# Patient Record
Sex: FEMALE | Race: WHITE | NOT HISPANIC OR LATINO | URBAN - METROPOLITAN AREA
[De-identification: names, ages, dates, MRNs, and addresses within clinical notes are randomized per-mention and may not be internally consistent; named-entity substitution may affect disease eponyms.]

---
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
## 2025-01-14 ENCOUNTER — HOSPITAL ENCOUNTER (OUTPATIENT)
Facility: MEDICAL CENTER | Age: 20
End: 2025-01-14
Attending: OTOLARYNGOLOGY | Admitting: OTOLARYNGOLOGY
Payer: COMMERCIAL

## 2025-01-16 DIAGNOSIS — N30.10 CHRONIC INTERSTITIAL CYSTITIS: ICD-10-CM

## 2025-01-16 DIAGNOSIS — N30.10 BLADDER PAIN SYNDROME: ICD-10-CM

## 2025-02-11 ENCOUNTER — OFFICE VISIT (OUTPATIENT)
Dept: URGENT CARE | Facility: CLINIC | Age: 20
End: 2025-02-11
Payer: COMMERCIAL

## 2025-02-11 VITALS
WEIGHT: 185.2 LBS | DIASTOLIC BLOOD PRESSURE: 72 MMHG | OXYGEN SATURATION: 100 % | RESPIRATION RATE: 18 BRPM | HEIGHT: 71 IN | BODY MASS INDEX: 25.93 KG/M2 | TEMPERATURE: 98.3 F | HEART RATE: 81 BPM | SYSTOLIC BLOOD PRESSURE: 120 MMHG

## 2025-02-11 DIAGNOSIS — K52.9 AGE (ACUTE GASTROENTERITIS): ICD-10-CM

## 2025-02-11 DIAGNOSIS — R11.0 NAUSEA: ICD-10-CM

## 2025-02-11 PROCEDURE — 99213 OFFICE O/P EST LOW 20 MIN: CPT | Performed by: NURSE PRACTITIONER

## 2025-02-11 PROCEDURE — 3078F DIAST BP <80 MM HG: CPT | Performed by: NURSE PRACTITIONER

## 2025-02-11 PROCEDURE — 3074F SYST BP LT 130 MM HG: CPT | Performed by: NURSE PRACTITIONER

## 2025-02-11 RX ORDER — ONDANSETRON 4 MG/1
4 TABLET, ORALLY DISINTEGRATING ORAL EVERY 8 HOURS PRN
Qty: 10 TABLET | Refills: 0 | Status: SHIPPED | OUTPATIENT
Start: 2025-02-11

## 2025-02-11 ASSESSMENT — ENCOUNTER SYMPTOMS
COUGH: 0
DIARRHEA: 1
SORE THROAT: 0
FATIGUE: 0
SWOLLEN GLANDS: 0
FEVER: 0
ROS GI COMMENTS: 1
VOMITING: 0
NAUSEA: 1
ABDOMINAL PAIN: 0
CHILLS: 0
CHANGE IN BOWEL HABIT: 1

## 2025-02-11 ASSESSMENT — FIBROSIS 4 INDEX: FIB4 SCORE: 0.41

## 2025-02-11 NOTE — LETTER
February 11, 2025         Patient: Polina Tovar   YOB: 2005   Date of Visit: 2/11/2025           To Whom it May Concern:    Polina Tovar was seen in my clinic on 2/11/2025. She may return to school on 2/12/25.    If you have any questions or concerns, please don't hesitate to call.        Sincerely,           MANDA Arango.  Electronically Signed

## 2025-02-12 NOTE — PROGRESS NOTES
"Subjective:   Polina Tovar is a 20 y.o. female who presents for Letter for School/Work (Patient is wanting a school note excusing her for yesterday due to vomiting, nausea, and diarrhea. )      GI Problem  This is a new problem. The current episode started yesterday. The problem occurs constantly. The problem has been gradually improving. Associated symptoms include a change in bowel habit and nausea. Pertinent negatives include no abdominal pain, chills, congestion, coughing, fatigue, fever, rash, sore throat, swollen glands or vomiting. She has tried drinking for the symptoms.       Review of Systems   Constitutional:  Negative for chills, fatigue and fever.   HENT:  Negative for congestion and sore throat.    Respiratory:  Negative for cough.    Gastrointestinal:  Positive for change in bowel habit, diarrhea and nausea. Negative for abdominal pain and vomiting.        1   Genitourinary:  Negative for dysuria.   Skin:  Negative for rash.       Medications:    albuterol Aers  ARIPiprazole  cloNIDine Tabs  escitalopram  hydrOXYzine HCl Tabs  lisdexamfetamine  ondansetron Tbdp    Allergies: Nsaids    Problem List: Polina Tovar does not have any pertinent problems on file.    Surgical History:  Past Surgical History:   Procedure Laterality Date    BREAST RECONSTRUCTION Bilateral     OTHER ABDOMINAL SURGERY      gastric sleeve       Past Social Hx: Polina Tovar  reports that she has never smoked. She has never used smokeless tobacco. She reports current alcohol use. She reports current drug use. Drug: Marijuana.     Past Family Hx:  Polina Tovar family history includes Hyperlipidemia in her mother; No Known Problems in her brother, father, and sister.     Problem list, medications, and allergies reviewed by myself today in Epic.     Objective:     /72   Pulse 81   Temp 36.8 °C (98.3 °F) (Temporal)   Resp 18   Ht 1.803 m (5' 11\")   Wt 84 kg (185 lb 3.2 oz)   SpO2 100%   BMI 25.83 kg/m² "     Physical Exam  Constitutional:       Appearance: Normal appearance. She is not ill-appearing or toxic-appearing.   HENT:      Head: Normocephalic.      Right Ear: External ear normal.      Left Ear: External ear normal.      Nose: Nose normal.      Mouth/Throat:      Lips: Pink.   Eyes:      General: Lids are normal.   Pulmonary:      Effort: Pulmonary effort is normal. No accessory muscle usage.   Abdominal:      Tenderness: There is no abdominal tenderness.   Musculoskeletal:      Cervical back: Full passive range of motion without pain.   Neurological:      Mental Status: She is alert and oriented to person, place, and time.   Psychiatric:         Mood and Affect: Mood normal.         Thought Content: Thought content normal.         Assessment/Plan:     Diagnosis and associated orders:     1. Nausea  ondansetron (ZOFRAN ODT) 4 MG TABLET DISPERSIBLE      2. AGE (acute gastroenteritis)           Comments/MDM:     It was explained today that due to the viral nature of the pt's illness, we will treat symptomatically today.   Encouraged OTC supportive meds PRN. Humidification, increase fluids,   Discussed side effects of OTC meds and any prescribed.  Given precautionary s/sx that mandate immediate follow up and evaluation in the ED. Advised of risks of not doing so.    DDX, Supportive care, and indications for immediate follow-up discussed with patient.    Instructed to return to clinic or nearest emergency department if we are not available for any change in condition, further concerns, or worsening of symptoms.    The patient  and/or guardian demonstrated a good understanding and agreed with the treatment plan.                   Please note that this dictation was created using voice recognition software. I have made a reasonable attempt to correct obvious errors, but I expect that there are errors of grammar and possibly content that I did not discover before finalizing the note.    This note was electronically  signed by Ish MARION.

## 2025-02-21 ENCOUNTER — APPOINTMENT (OUTPATIENT)
Dept: URGENT CARE | Facility: CLINIC | Age: 20
End: 2025-02-21
Payer: COMMERCIAL

## 2025-05-02 ENCOUNTER — OFFICE VISIT (OUTPATIENT)
Dept: MEDICAL GROUP | Age: 20
End: 2025-05-02
Payer: COMMERCIAL

## 2025-05-02 VITALS
WEIGHT: 185 LBS | DIASTOLIC BLOOD PRESSURE: 86 MMHG | SYSTOLIC BLOOD PRESSURE: 110 MMHG | HEIGHT: 71 IN | TEMPERATURE: 97 F | BODY MASS INDEX: 25.9 KG/M2 | OXYGEN SATURATION: 98 % | HEART RATE: 117 BPM

## 2025-05-02 DIAGNOSIS — R00.2 PALPITATIONS: ICD-10-CM

## 2025-05-02 DIAGNOSIS — R55 SYNCOPE, UNSPECIFIED SYNCOPE TYPE: ICD-10-CM

## 2025-05-02 DIAGNOSIS — R23.8 SKIN SENSITIVITY: ICD-10-CM

## 2025-05-02 DIAGNOSIS — N30.10 CHRONIC INTERSTITIAL CYSTITIS: ICD-10-CM

## 2025-05-02 DIAGNOSIS — M25.50 ARTHRALGIA, UNSPECIFIED JOINT: ICD-10-CM

## 2025-05-02 DIAGNOSIS — R53.83 OTHER FATIGUE: ICD-10-CM

## 2025-05-02 DIAGNOSIS — K64.9 HEMORRHOIDS, UNSPECIFIED HEMORRHOID TYPE: ICD-10-CM

## 2025-05-02 PROCEDURE — 3074F SYST BP LT 130 MM HG: CPT | Performed by: PHYSICIAN ASSISTANT

## 2025-05-02 PROCEDURE — 3078F DIAST BP <80 MM HG: CPT | Performed by: PHYSICIAN ASSISTANT

## 2025-05-02 PROCEDURE — 99215 OFFICE O/P EST HI 40 MIN: CPT | Performed by: PHYSICIAN ASSISTANT

## 2025-05-02 RX ORDER — AZITHROMYCIN 500 MG/1
TABLET, FILM COATED ORAL
COMMUNITY
Start: 2025-03-01 | End: 2025-05-02

## 2025-05-02 RX ORDER — OXYCODONE AND ACETAMINOPHEN 5; 325 MG/1; MG/1
TABLET ORAL
COMMUNITY
Start: 2025-02-24 | End: 2025-05-02

## 2025-05-02 RX ORDER — SULFAMETHOXAZOLE AND TRIMETHOPRIM 800; 160 MG/1; MG/1
TABLET ORAL
COMMUNITY
End: 2025-05-02

## 2025-05-02 RX ORDER — NITROFURANTOIN 25; 75 MG/1; MG/1
CAPSULE ORAL
COMMUNITY
Start: 2024-09-23 | End: 2025-05-02

## 2025-05-02 RX ORDER — CIPROFLOXACIN 500 MG/1
TABLET, FILM COATED ORAL
COMMUNITY
Start: 2025-01-16 | End: 2025-05-02

## 2025-05-02 RX ORDER — NYSTATIN 100000 [USP'U]/ML
SUSPENSION ORAL
COMMUNITY
Start: 2025-02-28 | End: 2025-05-02

## 2025-05-02 RX ORDER — OXYCODONE HYDROCHLORIDE 5 MG/1
TABLET ORAL
COMMUNITY
Start: 2024-09-27 | End: 2025-05-02

## 2025-05-02 RX ORDER — AMOXICILLIN AND CLAVULANATE POTASSIUM 500; 125 MG/1; MG/1
TABLET, FILM COATED ORAL
COMMUNITY
Start: 2024-09-27 | End: 2025-05-02

## 2025-05-02 ASSESSMENT — PATIENT HEALTH QUESTIONNAIRE - PHQ9: CLINICAL INTERPRETATION OF PHQ2 SCORE: 0

## 2025-05-02 ASSESSMENT — FIBROSIS 4 INDEX: FIB4 SCORE: 0.39

## 2025-05-02 NOTE — PROGRESS NOTES
cc: Hospital follow-up, palpitations, syncopal episodes, rheumatological workup    Subjective:     HPI    History of Present Illness  The patient was admitted from 04/27/2025 through 04/29/2025 with a diagnosis of an infection due to ESBL-producing E. coli. She was referred by urology for urinary incontinence. An initial urinalysis was performed, and she was discharged once her nausea subsided. However, she was called back for admission and IV antibiotics after culture results. She received fosfomycin but experienced an allergic reaction and was sent home with epinephrine. Upon returning to the hospital, she reported symptoms that were 10 times worse, including difficulty urinating, a burning sensation, and urinary incontinence. She was subsequently treated with IV Zosyn. Her case was reviewed with Dr. Lucio from Infectious Disease, who determined that there are no current indications to continue antibiotics as she had already received a dose of medication that should have adequately treated her ESBL E. coli cystitis.  In regards to her urinary symptoms they have improved, did have an appointment with her urologist yesterday, but had to cancel as she had to go to work.  Plans to follow-up with her urologist soon.    She reports experiencing dizziness and hives on her chest, which she attributes to a drop in blood sugar levels. These symptoms appear to be triggered if she does not consume sugary foods or drinks. She also experiences lightheadedness and near syncope. Her urologist has recommended testing for autoimmune disorders due to the concurrent onset of various symptoms. She has a family history of lupus and personally experiences migraines, bladder issues, hives, and new allergies. She also reports increased sun sensitivity, resulting in blistering burns even with sunscreen use. She has previously consulted an allergist. She experiences joint pain, particularly in her elbows and knees, and reports persistent  swelling but no heat in these joints. She also reports cognitive difficulties, including poor memory and decreased motivation, which have impacted her academic performance. She reports an improvement in her urinary symptoms. She missed a scheduled appointment with her urologist due to work commitments and needs to schedule another one.    She reports experiencing hemorrhoids and is seeking advice on management. She reports rectal bleeding but no itching or pain.    She reports experiencing dizziness and swelling when exposed to heat, such as during hot baths or showers. She experienced a syncopal episode approximately one month ago, preceded by a sensation of impending loss of consciousness, which allowed her to sit down and prevent a fall. She has been experiencing chest pain and shortness of breath for the past 6 to 10 weeks. She experiences daily palpitations, which last until she can relax and consume something sugary.            Review of systems:  See above.       Current Outpatient Medications:     ondansetron (ZOFRAN ODT) 4 MG TABLET DISPERSIBLE, Take 1 Tablet by mouth every 8 hours as needed for Nausea/Vomiting., Disp: 10 Tablet, Rfl: 0    escitalopram (LEXAPRO) 20 MG tablet, Take 20 mg by mouth every day., Disp: , Rfl:     hydrOXYzine HCl (ATARAX) 50 MG Tab, TAKE 1/2 TO 1 TABLET BY MOUTH DAILY AS NEEDED FOR ANXIETY, Disp: , Rfl:     lisdexamfetamine (VYVANSE) 50 MG capsule, 60 mg., Disp: , Rfl:     albuterol 108 (90 Base) MCG/ACT Aero Soln inhalation aerosol, , Disp: , Rfl:     ondansetron (ZOFRAN ODT) 4 MG TABLET DISPERSIBLE, Take 1 Tablet by mouth every 6 hours as needed for Nausea/Vomiting., Disp: 10 Tablet, Rfl: 0    ARIPiprazole (ABILIFY) 5 MG tablet, Take 5 mg by mouth every day., Disp: , Rfl:     cloNIDine (CATAPRES) 0.2 MG Tab, Take 1 Tablet by mouth at bedtime., Disp: , Rfl:     oxyCODONE immediate-release (ROXICODONE) 5 MG Tab, Take 1 tablet(s) EVERY 4 HOURS by oral route prn severe pain  "refarctory to ibuprofen or tylenol (for 5 days) (Patient not taking: Reported on 5/2/2025), Disp: , Rfl:     oxyCODONE-acetaminophen (PERCOCET) 5-325 MG Tab, TAKE 1 TABLET BY MOUTH EVERY 6 HOURS FOR 3 DAYS AS NEEDED (Patient not taking: Reported on 5/2/2025), Disp: , Rfl:     sulfamethoxazole-trimethoprim (BACTRIM DS) 800-160 MG tablet, TAKE 1 TABLET BY MOUTH EVERY 12 HOURS FOR 5 DAYS (Patient not taking: Reported on 5/2/2025), Disp: , Rfl:     Allergies, past medical history, past surgical history, family history, social history reviewed and updated    Objective:     Vitals: /86 (BP Location: Right arm, Patient Position: Standing, BP Cuff Size: Adult)   Pulse (!) 117   Temp 36.1 °C (97 °F) (Temporal)   Ht 1.803 m (5' 11\")   Wt 83.9 kg (185 lb)   SpO2 98%   BMI 25.80 kg/m²   General: Alert, pleasant, NAD  HEENT: Normocephalic. Neck supple.  No thyromegaly or masses palpated. No cervical or supraclavicular lymphadenopathy. No carotid bruits   Heart: Regular rate and rhythm.  S1 and S2 normal.  No murmurs appreciated.  Respiratory: Normal respiratory effort.  Clear to auscultation bilaterally.  Skin: Warm, dry, no rashes.  Extremities: No leg edema.  No joint redness or warmth.  Radial pulses 2+ symmetric  Psych:  Affect/mood is normal, judgement is good, memory is intact, grooming is appropriate.      EKG Interpretation-HR is 63 normal EKG, normal sinus rhythm.  No previous EKG to review    Orthostatics: lying pulse 64, /72, sitting pulse 74, /80, standing pulse 117, /86      Assessment/Plan:     Polina was seen today for ed follow-up.    Diagnoses and all orders for this visit:    Chronic interstitial cystitis  Did require IV antibiotics during hospital, infectious disease was consulted, did not believe that she needed to be treated again as she did receive the josamycin.  Symptomatically doing better.  Following up with urology.    Arthralgia, unspecified joint  -In discussion with " patient she is not meeting any true specific markers for rheumatoid for this or lupus, however does have nonspecific joint pain, intermittent rashes, but not a Maller rash, fatigue, we will screen for lupus as this has been mentioned to her by her ENT specialist and urologist.  -     PATRICIA REFLEXIVE PROFILE; Future  -     CCP; Future  -     DSDNA AB, IGG W/RFLX TO IFA TITER; Future  -     RHEUMATOID ARTHRITIS FACTOR; Future  -     Sed Rate; Future  -     TSH WITH REFLEX TO FT4; Future  -     VITAMIN D,25 HYDROXY (DEFICIENCY); Future  -     CBC WITH DIFFERENTIAL; Future    Other fatigue  See above    Syncope, unspecified syncope type  -Did have a syncopal event approximately 4 weeks ago, has been having intermittent palpitations with associated chest pain.  On orthostatics blood pressure remained stable however heart rate did increase almost 40 beats when going from sitting to standing.  Potentially could be a component of POTS.  Will obtain Zio patch, also placed referral to cardiology to consider tilt table test  -     TSH WITH REFLEX TO FT4; Future  -     CBC WITH DIFFERENTIAL; Future  -     [facility] ZIO PATCH MONITOR; Future  -     EKG - Clinic Performed  -     REFERRAL TO CARDIOLOGY  -     Comp Metabolic Panel; Future    Palpitations  -See above  -     [facility] ZIO PATCH MONITOR; Future  -     EKG - Clinic Performed  -     REFERRAL TO CARDIOLOGY    Hemorrhoids, unspecified hemorrhoid type  Recommended sitz bath's, fiber daily, over-the-counter Preparation H.  If symptoms persist do not improve will consider referral to general surgery for banding or excision    Skin sensitivity  -Has been noting more skin sensitivities, interestingly a lot more sensitivity to the sun also intermittent hives, although could be anxiety/stress related.  Referral placed to allergist to consider allergy skin testing  -     Referral to Allergy    My total time spent caring for the patient on the day of the encounter was 40 minutes.   This does  not include time spent on separately billable procedures/tests.      Return in about 6 weeks (around 6/13/2025) for Lab Review/ ziopatch result.

## 2025-05-08 ENCOUNTER — NON-PROVIDER VISIT (OUTPATIENT)
Dept: CARDIOLOGY | Facility: MEDICAL CENTER | Age: 20
End: 2025-05-08
Attending: PHYSICIAN ASSISTANT
Payer: COMMERCIAL

## 2025-05-08 DIAGNOSIS — R00.2 PALPITATIONS: ICD-10-CM

## 2025-05-08 DIAGNOSIS — R55 SYNCOPE, UNSPECIFIED SYNCOPE TYPE: ICD-10-CM

## 2025-05-08 PROCEDURE — 93246 EXT ECG>7D<15D RECORDING: CPT

## 2025-05-08 NOTE — Clinical Note
REFERRAL APPROVAL NOTICE         Sent on May 8, 2025                   Polina Tovar  [address] [place] [address]                   Dear Ms. Tovar,    After a careful review of the medical information and benefit coverage, [facility] has processed your referral. See below for additional details.    If applicable, you must be actively enrolled with your insurance for coverage of the authorized service. If you have any questions regarding your coverage, please contact your insurance directly.    REFERRAL INFORMATION   Referral #:  [id number]  Referred-To Provider    Referred-By Provider:  Allergy and Immunology    Ashley Gomez P.A.-C.   [facility]      [address] [place] [address]  [phone] [address] [address]  [phone]    Referral Start Date:  05/02/2025  Referral End Date:   05/02/2026             SCHEDULING  If you do not already have an appointment, please call [phone] to make an appointment.     MORE INFORMATION  If you do not already have a [employer] account, sign up at: [email or web address]  You can access your medical information, make appointments, see lab results, billing information, and more.  If you have questions regarding this referral, please contact  the [facility] Referrals department at:             [phone]. Monday - Friday 8:00AM - 5:00PM.     Sincerely,    [facility]

## 2025-05-08 NOTE — PROGRESS NOTES
Patient enrolled in the 14 day o Holter monitoring program per Ashley Gomze PA-C.  >Office hook-up, serial # [id number].  >Currently pending EOS.

## 2025-05-14 ENCOUNTER — APPOINTMENT (OUTPATIENT)
Dept: CARDIOLOGY | Facility: MEDICAL CENTER | Age: 20
End: 2025-05-14
Attending: PHYSICIAN ASSISTANT
Payer: COMMERCIAL

## 2025-06-14 NOTE — PROGRESS NOTES
Medical decision making:  - She appears to be on the spectrum of autonomic dysfunction, it is unclear if she has more of orthostatic hypotension component or POTS component. Heart monitor turned in, results are not back yet.  This will tell us more about her physiology.  -Echo for reassurance that she has a structurally normal heart, no rush.  -Information give about lifestyle changes, increased salt with fluids, compression socks. If she continues to have symptoms, consider pharmacotherapy intervention listed below.  - Has asthma so I am not certain how she would do with beta-blockers.  - She is working on anxiety control.  -It is unclear if her nerve stimulator for cystitis is playing a role but her quality of life is much improved with this so we would never ask her to remove it or turn it off.  -Vyvanse can cause tachycardia, sometimes side effects are offset it with beta-blockers or calcium channel blockers.  - I do not think she needs tilt table testing, at least not for me.  - She is very physically active all day long at her job, no formal exercise regimen.  - RTC 4 to 6 weeks to follow-up on heart monitor and interventions listed below.    Problem list:  1. Palpitations  - EKG  - EC-ECHOCARDIOGRAM COMPLETE W/O CONT; Future    2. S/P gastric sleeve procedure    3. Near syncope    4. Autonomic dysfunction    5. Mild intermittent asthma without complication    6. Other specified hypotension     Chief Complaint:   Chief Complaint   Patient presents with    New Patient     Per referral:  -Syncope, unspecified syncope type   - Palpitations           History of Present Illness:  Polina Tovar is a 20 y.o. female who is referred by Ashley Gomez for palpitations, syncope, prior gastric sleeve, autonomic dysfunction, cystitis with pain neurostimulator.    Has been dealing with cystitis, has a pain stimulator.  Was in the hospital with bacteria.  Noticed abnormal heart rate and blood pressure.    Has had  lightheadedness going back about 1 year.  Progressing in frequency and intensity over the past year.  Stimulator for cystitis 1 and at that time but there did not appear to be a direct correlation with her symptoms.  Episodes began with lightheadedness, specifically she is falling over, sees colors flashing, loses balance.  Feels much better when she can sit down and even much better when she lies down.  Has to stay down for at least 2 minutes.  Has had episodes of syncope also.  They came home in the evening from playing pickle ball, getting ready to have a shower. Was under stress, having anxiety, Stanley heard crying, then found her sitting on the floor and slumped over, shaking, strange breathing per Stanley who was able to get her up and onto the bed. She recalls being confused. Appeared pale.   Very hot showers can make symptoms worse and legs more swollen.  Can get heart racing and palpitations at times.  Intermittent atypical chest discomfort.    Just wear heart monitor which has been turned in.    She has tried strategies of eating more frequently, trying to stay well-hydrated, still having some symptoms.    Has challenging to crawl anxiety and insomnia which may be contributing.    No known COVID.  Not sure if any of the symptoms since 2020 are related to vaccination.    Prior gastric sleeve    No prior hypertension. BP runs low but can spike at times.  Sounds like it was elevated during orthostatics and also in the hospital.    HLP, no meds.    Asthma since childhood.  Has not been on a maintenance inhaler.     No family history of premature coronary artery disease.  No prior smoking history.  No history of diabetes.  No history of autoimmune disease such as lupus or rheumatoid arthritis.  No history of chest radiation or cardiotoxic chemotherapy.  No chronic kidney disease.  No ETOH overuse.  No caffeine overuse.  No recreation substance use.    Not limited by chest pain, pressure or tightness with  "activity.  No significant dyspnea on exertion, orthopnea or lower extremity swelling.  No significant palpitations, lightheadedness, or presyncope/syncope.  No symptoms of leg claudication.  No stroke/TIA like symptoms.    Lives in [place], [place].  Point of contact is her mother Brielle Tovar.  Here with SOStanley.   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    Wt Readings from Last 5 Encounters:   06/20/25 83.9 kg (185 lb)   05/02/25 83.9 kg (185 lb)   02/11/25 84 kg (185 lb 3.2 oz)   11/05/24 92.9 kg (204 lb 12.8 oz) (98%, Z= 2.01)*   10/28/24 90.4 kg (199 lb 4.8 oz) (97%, Z= 1.93)*     * Growth percentiles are based on [facility] (Girls, 2-20 Years) data.       DATA REVIEWED by me:  ECG (my personal interpretation)  6-20-25 sinus, 82  5-2-25 sinus, 63    Heart Monitor    NA    Device check    NA    EP procedure  NA    Echocardiogram    pending    Heart Catheterization    NA    Stress test    N/A    Most recent labs:        No results found for: \"LIPOPROTA\"  Lab Results   Component Value Date/Time    WBC 8.4 06/19/2025 12:34 AM    HEMOGLOBIN 14.4 06/19/2025 12:34 AM    HEMATOCRIT 43.7 06/19/2025 12:34 AM    MCV 80.9 (L) 06/19/2025 12:34 AM      Lab Results   Component Value Date/Time    SODIUM 137 06/19/2025 12:34 AM    POTASSIUM 3.7 06/19/2025 12:34 AM    CHLORIDE 104 06/19/2025 12:34 AM    CO2 20 06/19/2025 12:34 AM    GLUCOSE 92 06/19/2025 12:34 AM    BUN 12 06/19/2025 12:34 AM    CREATININE 0.67 06/19/2025 12:34 AM      Lab Results   Component Value Date/Time    ASTSGOT 25 06/19/2025 12:34 AM    ALTSGPT 19 06/19/2025 12:34 AM    ALBUMIN 4.7 06/19/2025 12:34 AM      No results found for: \"CHOLSTRLTOT\", \"LDL\", \"HDL\", \"TRIGLYCERIDE\"  Recent Labs     06/19/25  0034   TROPONINT <6         Past Medical History[1]  Past Surgical History[2]  Family History   Problem Relation Age of Onset    Hyperlipidemia Mother     No Known Problems Father     No Known Problems Sister     No Known Problems Brother  "     Social History     Socioeconomic History    Marital status: Single     Spouse name: Not on file    Number of children: Not on file    Years of education: Not on file    Highest education level: 12th grade   Occupational History    Not on file   Tobacco Use    Smoking status: Never    Smokeless tobacco: Never   Vaping Use    Vaping status: Some Days    Substances: Nicotine   Substance and Sexual Activity    Alcohol use: Yes     Comment: rare    Drug use: Yes     Types: Marijuana     Comment: rare    Sexual activity: Yes     Partners: Male     Birth control/protection: I.U.D.   Other Topics Concern    Not on file   Social History Narrative    Not on file     Social Drivers of Health     Financial Resource Strain: Low Risk  (3/13/2024)    Overall Financial Resource Strain (CARDIA)     Difficulty of Paying Living Expenses: Not very hard   Food Insecurity: No Food Insecurity (3/13/2024)    Hunger Vital Sign     Worried About Running Out of Food in the Last Year: Never true     Ran Out of Food in the Last Year: Never true   Transportation Needs: No Transportation Needs (3/13/2024)    PRAPARE - Transportation     Lack of Transportation (Medical): No     Lack of Transportation (Non-Medical): No   Physical Activity: Sufficiently Active (3/13/2024)    Exercise Vital Sign     Days of Exercise per Week: 7 days     Minutes of Exercise per Session: 40 min   Stress: Stress Concern Present (3/13/2024)    [employer] - Occupational Stress Questionnaire     Feeling of Stress : To some extent   Social Connections: Moderately Isolated (3/13/2024)    Social Connection and Isolation Panel [NHANES]     Frequency of Communication with Friends and Family: More than three times a week     Frequency of Social Gatherings with Friends and Family: More than three times a week     Attends Tenri[employer] Services: 1 to 4 times per year     Active Member of Clubs or Organizations: No     Attends Club or Organization  "Meetings: Never     Marital Status: Never    Intimate Partner Violence: Not on file   Housing Stability: Low Risk  (3/13/2024)    Housing Stability Vital Sign     Unable to Pay for Housing in the Last Year: No     Number of Places Lived in the Last Year: 2     Unstable Housing in the Last Year: No     Allergies[3]    Current Medications[4]    ROS  All others systems reviewed and negative.    BP 90/58 (BP Location: Left arm, Patient Position: Sitting, BP Cuff Size: Adult)   Pulse 74   Resp 14   Ht 1.803 m (5' 11\")   Wt 83.9 kg (185 lb)   SpO2 97%  Body mass index is 25.8 kg/m².    General: No acute distress. Well nourished.  HEENT: EOM grossly intact, no scleral icterus, no pharyngeal erythema.   Neck:  No JVD, no bruits, trachea midline  CVS: RRR. Normal S1, S2. No M/R/G. No LE edema.  2+ radial pulses, 2+ PT pulses  Resp: CTAB. No wheezing or crackles/rhonchi. Normal respiratory effort.  Abdomen: Soft, NT, no sanya hepatomegaly.  MSK/Ext: No clubbing or cyanosis.  Skin: Warm and dry, no rashes.  Neurological: CN III-XII grossly intact. No focal deficits.   Psych: A&O x 3, appropriate affect, good judgement, tearful at times      Return in about 4 weeks (around 7/18/2025).    Written instructions given today:      Autonomic dysfunction, orthostatic tension, POTS and Inappropriate Sinus Tachycardia are all on the spectrum but I simply like to classify as autonomic dysfunction.    This autonomic dysfunction is not a serious heart condition.  It is abnormality in the autonomic nervous system.  Heart rate and blood pressure regulation have very little to do with heart function.    Heart rate and blood pressure are regulated predominantly by the autonomic nervous system, salt and water balance and hormone balance.  The good news is that you do not have a serious heart condition.    But this can also be frustrating as targeted therapies are implemented through predominantly a trial and error approach.    I am " not an expert in these conditions as they are fundamentally not a cardiology condition, however I am willing to try to adjust the situation to help relieve symptoms.  We do not have an autonomic dysfunction center in this area.  There are autonomic dysfunction centers at [place], the [facility], [facility] in [facility], [facility] in [facility].  We can refer you if you prefer.    There are very few electrophysiologist cardiologist who deal with physiology.  Electrophysiologist cardiologists in modern times predominantly deal specifically with electrical disease located in the heart.  Your symptoms do not reflect electrical disease so in our region, electrophysiologist have no role.  Your symptoms reflect the autonomic nervous system telling the heart to inappropriately speed up which can cause people to feel very poorly but does not result from cardiac disease.  Some people have inappropriate sinus tachycardia (IST) which means the heart rate speeds up quickly because the heart is being told to beat faster due to inappropriate regulation from the nervous system.    Blood pressure regulation example:  When we stand up, more than 700 mL of blood begins to drop into the legs.  The receptors in the neck and the chest that sense the sudden drop are supposed to tell the blood vessels in the body to clamp down hard resulting in blood pressure going up slightly.  This is something that does not work correctly for some people with PASC or POTS.    Sometimes the faster heart rate is the predominant feature and we use interventions to manipulate the heart rate response.  For some patients it is predominantly the drop in blood pressure in which case we focus more on keeping the blood volume in the upper body as high as possible.  For many people it is both.    While I am not a specialist, I have found there are few providers in the region willing to take on this diagnosis.  I have been collecting  data from other providers and patients to find some things that might help.    =====  Interventions:  1. Salt and water intake is vital.  Some patients have to increase their salt/sodium between 2000 to 4000 mg daily with water.  Drinking regular water will result in water passing straight through to the toilet.  You need the salt to keep the water inside the blood vessels to raise blood pressure.  2.  Compression socks: Do not underestimate the value, even if they only help 5 to 10%.  Compression socks help keep blood from dropping into the legs and staying there.  There are knee-high compression socks, thigh-high compression socks (which often fall down) and pantyhose style compression socks.  Compression socks are typically based on shoe size and the amount of pressure.  You do not need hospital grade socks, but I do recommend you experiment with the amount of pressure until you find what works for you.  The socks are based on shoe size and amount of compression (mmHg).  I recommend a larger size to avoid toe compression.  Compression strengths:  8-15 mmHg (typically very light compression)  15-20 mmHg  20-30 mmHg  30-40 mmhg (can be very tight and challenging to get on)  *Since they are a standard size larger legs need less compression, smaller legs may need more compression.  3.  Exercise: You will need to research a way to exercise and fight back.    -Some people can only exercise for short times and must start and stop.    -Some people need extra recovery time the next day and cannot exercise every day.    -Some people need to exercise lying on the floor, such as calisthenics.  -Some people can exercise in a recumbent bicycle.  Some people purchase a leg bicycle to use in their own home.  -You will need to set a goal for yourself, even if it is only for 10 minutes a day.  Your goal is to work up 20 to 30 minutes every day even if it takes months.  Cardiovascular exercises the way to retrain your physiology,  to blunt the heart rate response and improve blood pressure.  It is okay to exercise with your compression socks.  Here is some information about exercise you might find helpful:  https://[email or web address]l.org/pdf/[facility]_Modified_[email or web address]_POTS_Exercise_Program.pdf  4.  Medical therapy interventions to help manipulate physiology can sometimes be helpful.  Please understand these medications are not intended to treat a serious medical situation so you do not have to take them.  These medications are simply there to help you feel better.  If you take any medication that makes you feel worse, stop the medication.    ======  Medical therapy interventions:  1.  Beta-blockers or calcium channel blockers-we often utilize these to blunt the fast heart rate response.  There are many different kinds that can be tried.  I typically start with metoprolol tartrate 25 mg tablets, you can take one half or 1 whole tablet twice daily.  You can take up to 4 tablets twice daily.  The maximum daily dose is metoprolol to tartrate 100 mg a.m. and p.m.  - If metoprolol does not work, sometimes a try propranolol, diltiazem/Cardizem or verapamil.  2.  Midodrine-this medication is like a reverse blood pressure medication.  This medication can be taken every 4-6 hours.  I recommend trying 5 or 10 mg taken 4 or 5 times a day to help relieve symptoms.  If the medication does not help, you do not have to take it.  Some people only take it as needed before exercise or certain activities and do not take it all day long.  3.  Droxidopa-this is an old Parkinson's style medication that can help some people with orthostatic hypotension and therefore help some people with autonomic dysfunction or POTS.  Sometimes this medication is expensive with your insurance, it can be purchased for cash online through the [employer] [facility] pharmacy for $37-80 per month depending on the dose and the frequency.  You can also look for a [employer] coupon.   Some patients get more expensive medications from [facility] and I can provide you with a few phone numbers where patients have had good results.  4. Florinef-is a corticosteroid type medication that stimulates steroids made by your adrenal glands.  Sometimes this helps with fluid and salt retention.  Sometimes long-term use can result in your adrenal glands not making as much of their own so I try to use this as more of a last resort.  5.  IV fluids-some people have such dramatic symptoms that from time to time they go to the infusion center for a liter of normal saline or lactated Ringer's.  This is something we can discuss if you feel it might be helpful.  6.  Ivabradine= Corlanor.  This is a wonderful medication that can treat inappropriate sinus tachycardia by slowing down the sinus node and the heart rate without affecting blood pressure.  Unfortunately, it is very expensive and very difficult to get approved from insurance.  Some people purchased a medication through [facility] for lower price.      It is my pleasure to participate in the care of Ms. Tovar.  Please do not hesitate to contact me with questions or concerns.    Kaylee Walker MD, [facility]  Cardiologist, [facility]    Please note that this dictation was created using voice recognition software. I have made every reasonable attempt to correct obvious errors, but it is possible there are errors of grammar and possibly content that I did not discover before finalizing the note.         [1]   Past Medical History:  Diagnosis Date    Allergy     Anxiety     Asthma     Clostridium difficile carrier 9/19/2022    C. diff (+) 9/19/22Problem added by Discern Expert based on Positive C Diff test result    Depression     Migraine    [2]   Past Surgical History:  Procedure Laterality Date    BREAST RECONSTRUCTION Bilateral     OTHER ABDOMINAL SURGERY      gastric sleeve   [3]   Allergies  Allergen Reactions    Ciprofloxacin Hives      Cipro    Fosfomycin Hives and Swelling    Nsaids Anaphylaxis, Anxiety, Hives, Nausea, Shortness of Breath and Swelling   [4]   Current Outpatient Medications   Medication Sig Dispense Refill    escitalopram (LEXAPRO) 20 MG tablet Take 20 mg by mouth every day.      lisdexamfetamine (VYVANSE) 50 MG capsule 60 mg.      albuterol 108 (90 Base) MCG/ACT Aero Soln inhalation aerosol       ARIPiprazole (ABILIFY) 5 MG tablet Take 5 mg by mouth every day.      cloNIDine (CATAPRES) 0.2 MG Tab Take 1 Tablet by mouth at bedtime.       No current facility-administered medications for this visit.

## 2025-06-17 ENCOUNTER — TELEPHONE (OUTPATIENT)
Facility: MEDICAL CENTER | Age: 20
End: 2025-06-17
Payer: COMMERCIAL

## 2025-06-18 ENCOUNTER — HOSPITAL ENCOUNTER (EMERGENCY)
Facility: MEDICAL CENTER | Age: 20
End: 2025-06-19
Payer: COMMERCIAL

## 2025-06-18 VITALS
OXYGEN SATURATION: 97 % | HEIGHT: 71 IN | TEMPERATURE: 98.4 F | BODY MASS INDEX: 24.5 KG/M2 | RESPIRATION RATE: 20 BRPM | SYSTOLIC BLOOD PRESSURE: 130 MMHG | HEART RATE: 98 BPM | WEIGHT: 175 LBS | DIASTOLIC BLOOD PRESSURE: 90 MMHG

## 2025-06-18 LAB
EKG IMPRESSION: NORMAL
GLUCOSE BLD STRIP.AUTO-MCNC: 86 MG/DL (ref 65–99)

## 2025-06-18 PROCEDURE — 93005 ELECTROCARDIOGRAM TRACING: CPT | Mod: TC

## 2025-06-18 PROCEDURE — 302449 STATCHG TRIAGE ONLY (STATISTIC)

## 2025-06-18 PROCEDURE — 82962 GLUCOSE BLOOD TEST: CPT

## 2025-06-18 ASSESSMENT — FIBROSIS 4 INDEX: FIB4 SCORE: 0.39

## 2025-06-18 NOTE — TELEPHONE ENCOUNTER
Called patient in regards to records for NP appointment with Dr. HINTON To review most recent lab results, ekg, any cardiac testing or ,if patient has been treated by a cardiologist. No answer, left voicemail to call back

## 2025-06-19 LAB
ALBUMIN SERPL BCP-MCNC: 4.7 G/DL (ref 3.2–4.9)
ALBUMIN/GLOB SERPL: 1.6 G/DL
ALP SERPL-CCNC: 75 U/L (ref 30–99)
ALT SERPL-CCNC: 19 U/L (ref 2–50)
ANION GAP SERPL CALC-SCNC: 13 MMOL/L (ref 7–16)
AST SERPL-CCNC: 25 U/L (ref 12–45)
BASOPHILS # BLD AUTO: 0.2 % (ref 0–1.8)
BASOPHILS # BLD: 0.02 K/UL (ref 0–0.12)
BILIRUB SERPL-MCNC: 0.8 MG/DL (ref 0.1–1.5)
BUN SERPL-MCNC: 12 MG/DL (ref 8–22)
CALCIUM ALBUM COR SERPL-MCNC: 8.9 MG/DL (ref 8.5–10.5)
CALCIUM SERPL-MCNC: 9.5 MG/DL (ref 8.5–10.5)
CHLORIDE SERPL-SCNC: 104 MMOL/L (ref 96–112)
CO2 SERPL-SCNC: 20 MMOL/L (ref 20–33)
CREAT SERPL-MCNC: 0.67 MG/DL (ref 0.5–1.4)
EOSINOPHIL # BLD AUTO: 0.04 K/UL (ref 0–0.51)
EOSINOPHIL NFR BLD: 0.5 % (ref 0–6.9)
ERYTHROCYTE [DISTWIDTH] IN BLOOD BY AUTOMATED COUNT: 37.7 FL (ref 35.9–50)
GFR SERPLBLD CREATININE-BSD FMLA CKD-EPI: 128 ML/MIN/1.73 M 2
GLOBULIN SER CALC-MCNC: 3 G/DL (ref 1.9–3.5)
GLUCOSE SERPL-MCNC: 92 MG/DL (ref 65–99)
HCG SERPL QL: NEGATIVE
HCT VFR BLD AUTO: 43.7 % (ref 37–47)
HGB BLD-MCNC: 14.4 G/DL (ref 12–16)
IMM GRANULOCYTES # BLD AUTO: 0.02 K/UL (ref 0–0.11)
IMM GRANULOCYTES NFR BLD AUTO: 0.2 % (ref 0–0.9)
LIPASE SERPL-CCNC: 19 U/L (ref 11–82)
LYMPHOCYTES # BLD AUTO: 3.49 K/UL (ref 1–4.8)
LYMPHOCYTES NFR BLD: 41.4 % (ref 22–41)
MCH RBC QN AUTO: 26.7 PG (ref 27–33)
MCHC RBC AUTO-ENTMCNC: 33 G/DL (ref 32.2–35.5)
MCV RBC AUTO: 80.9 FL (ref 81.4–97.8)
MONOCYTES # BLD AUTO: 0.6 K/UL (ref 0–0.85)
MONOCYTES NFR BLD AUTO: 7.1 % (ref 0–13.4)
NEUTROPHILS # BLD AUTO: 4.26 K/UL (ref 1.82–7.42)
NEUTROPHILS NFR BLD: 50.6 % (ref 44–72)
NRBC # BLD AUTO: 0 K/UL
NRBC BLD-RTO: 0 /100 WBC (ref 0–0.2)
PLATELET # BLD AUTO: 328 K/UL (ref 164–446)
PMV BLD AUTO: 10.2 FL (ref 9–12.9)
POTASSIUM SERPL-SCNC: 3.7 MMOL/L (ref 3.6–5.5)
PROT SERPL-MCNC: 7.7 G/DL (ref 6–8.2)
RBC # BLD AUTO: 5.4 M/UL (ref 4.2–5.4)
SODIUM SERPL-SCNC: 137 MMOL/L (ref 135–145)
TROPONIN T SERPL-MCNC: <6 NG/L (ref 6–19)
WBC # BLD AUTO: 8.4 K/UL (ref 4.8–10.8)

## 2025-06-19 PROCEDURE — 84484 ASSAY OF TROPONIN QUANT: CPT

## 2025-06-19 PROCEDURE — 85025 COMPLETE CBC W/AUTO DIFF WBC: CPT

## 2025-06-19 PROCEDURE — 80053 COMPREHEN METABOLIC PANEL: CPT

## 2025-06-19 PROCEDURE — 83690 ASSAY OF LIPASE: CPT

## 2025-06-19 PROCEDURE — 84703 CHORIONIC GONADOTROPIN ASSAY: CPT

## 2025-06-19 NOTE — ED TRIAGE NOTES
Chief Complaint   Patient presents with    Syncope     Pt had syncopal episode since 2 hours ago    + dizziness  + headache  + nausea/ vomiting  + abdominal pain  + chest pain  + shortness of breath    Dizziness    N/V    Abdominal Pain     + diarrhea this morning    Denied fever    Shortness of Breath    Sore Throat     Pain:  8/10  Ambulatory:  on wheelchair  Alert and Oriented: x 4  Oxygen Treatment: No    Pt came in to triage for the above complaints.     Pt is speaking in full sentences, follows commands and responds appropriately to questions.     Respirations are even and unlabored.    Pt placed in lobby. Pt educated on triage process.     Pt encouraged to inform staff for any changes in condition or if needs help while waiting to be room in.      Vitals:    06/18/25 2334   BP: (!) 130/90   Pulse: 98   Resp: 20   Temp: 36.9 °C (98.4 °F)   SpO2: 97%

## 2025-06-20 ENCOUNTER — OFFICE VISIT (OUTPATIENT)
Facility: MEDICAL CENTER | Age: 20
End: 2025-06-20
Attending: PHYSICIAN ASSISTANT
Payer: COMMERCIAL

## 2025-06-20 VITALS
SYSTOLIC BLOOD PRESSURE: 90 MMHG | OXYGEN SATURATION: 97 % | WEIGHT: 185 LBS | RESPIRATION RATE: 14 BRPM | BODY MASS INDEX: 25.9 KG/M2 | HEART RATE: 74 BPM | HEIGHT: 71 IN | DIASTOLIC BLOOD PRESSURE: 58 MMHG

## 2025-06-20 DIAGNOSIS — G90.9 AUTONOMIC DYSFUNCTION: ICD-10-CM

## 2025-06-20 DIAGNOSIS — R55 NEAR SYNCOPE: ICD-10-CM

## 2025-06-20 DIAGNOSIS — Z90.3 S/P GASTRIC SLEEVE PROCEDURE: ICD-10-CM

## 2025-06-20 DIAGNOSIS — J45.20 MILD INTERMITTENT ASTHMA WITHOUT COMPLICATION: ICD-10-CM

## 2025-06-20 DIAGNOSIS — I95.89 OTHER SPECIFIED HYPOTENSION: ICD-10-CM

## 2025-06-20 DIAGNOSIS — R00.2 PALPITATIONS: Primary | ICD-10-CM

## 2025-06-20 LAB — EKG IMPRESSION: NORMAL

## 2025-06-20 PROCEDURE — 93010 ELECTROCARDIOGRAM REPORT: CPT | Performed by: INTERNAL MEDICINE

## 2025-06-20 PROCEDURE — 99212 OFFICE O/P EST SF 10 MIN: CPT | Performed by: INTERNAL MEDICINE

## 2025-06-20 PROCEDURE — 3074F SYST BP LT 130 MM HG: CPT | Performed by: INTERNAL MEDICINE

## 2025-06-20 PROCEDURE — 99204 OFFICE O/P NEW MOD 45 MIN: CPT | Performed by: INTERNAL MEDICINE

## 2025-06-20 PROCEDURE — 3078F DIAST BP <80 MM HG: CPT | Performed by: INTERNAL MEDICINE

## 2025-06-20 PROCEDURE — 93005 ELECTROCARDIOGRAM TRACING: CPT | Mod: TC | Performed by: INTERNAL MEDICINE

## 2025-06-20 ASSESSMENT — FIBROSIS 4 INDEX: FIB4 SCORE: 0.35

## 2025-06-20 NOTE — PATIENT INSTRUCTIONS
Autonomic dysfunction, orthostatic tension, POTS and Inappropriate Sinus Tachycardia are all on the spectrum but I simply like to classify as autonomic dysfunction.    This autonomic dysfunction is not a serious heart condition.  It is abnormality in the autonomic nervous system.  Heart rate and blood pressure regulation have very little to do with heart function.    Heart rate and blood pressure are regulated predominantly by the autonomic nervous system, salt and water balance and hormone balance.  The good news is that you do not have a serious heart condition.    But this can also be frustrating as targeted therapies are implemented through predominantly a trial and error approach.    I am not an expert in these conditions as they are fundamentally not a cardiology condition, however I am willing to try to adjust the situation to help relieve symptoms.  We do not have an autonomic dysfunction center in this area.  There are autonomic dysfunction centers at [place], the [facility], [facility] in [facility], [facility] in [facility].  We can refer you if you prefer.    There are very few electrophysiologist cardiologist who deal with physiology.  Electrophysiologist cardiologists in modern times predominantly deal specifically with electrical disease located in the heart.  Your symptoms do not reflect electrical disease so in our region, electrophysiologist have no role.  Your symptoms reflect the autonomic nervous system telling the heart to inappropriately speed up which can cause people to feel very poorly but does not result from cardiac disease.  Some people have inappropriate sinus tachycardia (IST) which means the heart rate speeds up quickly because the heart is being told to beat faster due to inappropriate regulation from the nervous system.    Blood pressure regulation example:  When we stand up, more than 700 mL of blood begins to drop into the legs.  The receptors in the  neck and the chest that sense the sudden drop are supposed to tell the blood vessels in the body to clamp down hard resulting in blood pressure going up slightly.  This is something that does not work correctly for some people with PASC or POTS.    Sometimes the faster heart rate is the predominant feature and we use interventions to manipulate the heart rate response.  For some patients it is predominantly the drop in blood pressure in which case we focus more on keeping the blood volume in the upper body as high as possible.  For many people it is both.    While I am not a specialist, I have found there are few providers in the region willing to take on this diagnosis.  I have been collecting data from other providers and patients to find some things that might help.    =====  Interventions:  1. Salt and water intake is vital.  Some patients have to increase their salt/sodium between 2000 to 4000 mg daily with water.  Drinking regular water will result in water passing straight through to the toilet.  You need the salt to keep the water inside the blood vessels to raise blood pressure.  2.  Compression socks: Do not underestimate the value, even if they only help 5 to 10%.  Compression socks help keep blood from dropping into the legs and staying there.  There are knee-high compression socks, thigh-high compression socks (which often fall down) and pantyhose style compression socks.  Compression socks are typically based on shoe size and the amount of pressure.  You do not need hospital grade socks, but I do recommend you experiment with the amount of pressure until you find what works for you.  The socks are based on shoe size and amount of compression (mmHg).  I recommend a larger size to avoid toe compression.  Compression strengths:  8-15 mmHg (typically very light compression)  15-20 mmHg  20-30 mmHg  30-40 mmhg (can be very tight and challenging to get on)  *Since they are a standard size larger legs need  less compression, smaller legs may need more compression.  3.  Exercise: You will need to research a way to exercise and fight back.    -Some people can only exercise for short times and must start and stop.    -Some people need extra recovery time the next day and cannot exercise every day.    -Some people need to exercise lying on the floor, such as calisthenics.  -Some people can exercise in a recumbent bicycle.  Some people purchase a leg bicycle to use in their own home.  -You will need to set a goal for yourself, even if it is only for 10 minutes a day.  Your goal is to work up 20 to 30 minutes every day even if it takes months.  Cardiovascular exercises the way to retrain your physiology, to blunt the heart rate response and improve blood pressure.  It is okay to exercise with your compression socks.  Here is some information about exercise you might find helpful:  [email or web address]  4.  Medical therapy interventions to help manipulate physiology can sometimes be helpful.  Please understand these medications are not intended to treat a serious medical situation so you do not have to take them.  These medications are simply there to help you feel better.  If you take any medication that makes you feel worse, stop the medication.    ======  Medical therapy interventions:  1.  Beta-blockers or calcium channel blockers-we often utilize these to blunt the fast heart rate response.  There are many different kinds that can be tried.  I typically start with metoprolol tartrate 25 mg tablets, you can take one half or 1 whole tablet twice daily.  You can take up to 4 tablets twice daily.  The maximum daily dose is metoprolol to tartrate 100 mg a.m. and p.m.  - If metoprolol does not work, sometimes a try propranolol, diltiazem/Cardizem or verapamil.  2.  Midodrine-this medication is like a reverse blood pressure medication.  This medication can  be taken every 4-6 hours.  I recommend trying 5 or 10 mg taken 4 or 5 times a day to help relieve symptoms.  If the medication does not help, you do not have to take it.  Some people only take it as needed before exercise or certain activities and do not take it all day long.  3.  Droxidopa-this is an old Parkinson's style medication that can help some people with orthostatic hypotension and therefore help some people with autonomic dysfunction or POTS.  Sometimes this medication is expensive with your insurance, it can be purchased for cash online through the [facility] pharmacy for $37-80 per month depending on the dose and the frequency.  You can also look for a [employer] coupon.  Some patients get more expensive medications from [facility] and I can provide you with a few phone numbers where patients have had good results.  4. Florinef-is a corticosteroid type medication that stimulates steroids made by your adrenal glands.  Sometimes this helps with fluid and salt retention.  Sometimes long-term use can result in your adrenal glands not making as much of their own so I try to use this as more of a last resort.  5.  IV fluids-some people have such dramatic symptoms that from time to time they go to the infusion center for a liter of normal saline or lactated Ringer's.  This is something we can discuss if you feel it might be helpful.  6.  Ivabradine= Corlanor.  This is a wonderful medication that can treat inappropriate sinus tachycardia by slowing down the sinus node and the heart rate without affecting blood pressure.  Unfortunately, it is very expensive and very difficult to get approved from insurance.  Some people purchased a medication through [facility] for lower price.

## 2025-06-20 NOTE — LETTER
[facility] [place] [facility]   [address],   [address]  [place] [address]  Phone: [phone]  Fax: [phone]              Polina Tovar  2005    Encounter Date: 6/20/2025    Kaylee Walker M.D.          PROGRESS NOTE:        Medical decision making:  - She appears to be on the spectrum of autonomic dysfunction, it is unclear if she has more of orthostatic hypotension component or POTS component. Heart monitor turned in, results are not back yet.  This will tell us more about her physiology.  -Echo for reassurance that she has a structurally normal heart, no rush.  -Information give about lifestyle changes, increased salt with fluids, compression socks. If she continues to have symptoms, consider pharmacotherapy intervention listed below.  - Has asthma so I am not certain how she would do with beta-blockers.  - She is working on anxiety control.  -It is unclear if her nerve stimulator for cystitis is playing a role but her quality of life is much improved with this so we would never ask her to remove it or turn it off.  -Vyvanse can cause tachycardia, sometimes side effects are offset it with beta-blockers or calcium channel blockers.  - I do not think she needs tilt table testing, at least not for me.  - She is very physically active all day long at her job, no formal exercise regimen.  - RTC 4 to 6 weeks to follow-up on heart monitor and interventions listed below.    Problem list:  1. Palpitations  - EKG  - EC-ECHOCARDIOGRAM COMPLETE W/O CONT; Future    2. S/P gastric sleeve procedure    3. Near syncope    4. Autonomic dysfunction    5. Mild intermittent asthma without complication    6. Other specified hypotension     Chief Complaint:   Chief Complaint   Patient presents with    New Patient     Per referral:  -Syncope, unspecified syncope type   - Palpitations           History of Present Illness:  Polina Tovar is a 20 y.o. female who is referred by  Ashley Gomez for palpitations, syncope, prior gastric sleeve, autonomic dysfunction, cystitis with pain neurostimulator.    Has been dealing with cystitis, has a pain stimulator.  Was in the hospital with bacteria.  Noticed abnormal heart rate and blood pressure.    Has had lightheadedness going back about 1 year.  Progressing in frequency and intensity over the past year.  Stimulator for cystitis 1 and at that time but there did not appear to be a direct correlation with her symptoms.  Episodes began with lightheadedness, specifically she is falling over, sees colors flashing, loses balance.  Feels much better when she can sit down and even much better when she lies down.  Has to stay down for at least 2 minutes.  Has had episodes of syncope also.  They came home in the evening from playing pickle ball, getting ready to have a shower. Was under stress, having anxiety, Stanley heard crying, then found her sitting on the floor and slumped over, shaking, strange breathing per Stanley who was able to get her up and onto the bed. She recalls being confused. Appeared pale.   Very hot showers can make symptoms worse and legs more swollen.  Can get heart racing and palpitations at times.  Intermittent atypical chest discomfort.    Just wear heart monitor which has been turned in.    She has tried strategies of eating more frequently, trying to stay well-hydrated, still having some symptoms.    Has challenging to crawl anxiety and insomnia which may be contributing.    No known COVID.  Not sure if any of the symptoms since 2020 are related to vaccination.    Prior gastric sleeve    No prior hypertension. BP runs low but can spike at times.  Sounds like it was elevated during orthostatics and also in the hospital.    HLP, no meds.    Asthma since childhood.  Has not been on a maintenance inhaler.     No family history of premature coronary artery disease.  No prior smoking history.  No history of diabetes.  No history of  "autoimmune disease such as lupus or rheumatoid arthritis.  No history of chest radiation or cardiotoxic chemotherapy.  No chronic kidney disease.  No ETOH overuse.  No caffeine overuse.  No recreation substance use.    Not limited by chest pain, pressure or tightness with activity.  No significant dyspnea on exertion, orthopnea or lower extremity swelling.  No significant palpitations, lightheadedness, or presyncope/syncope.  No symptoms of leg claudication.  No stroke/TIA like symptoms.    Lives in [place], [place].  Point of contact is her mother Brielle Tovar.  Here with SOStanley.   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    Wt Readings from Last 5 Encounters:   06/20/25 83.9 kg (185 lb)   05/02/25 83.9 kg (185 lb)   02/11/25 84 kg (185 lb 3.2 oz)   11/05/24 92.9 kg (204 lb 12.8 oz) (98%, Z= 2.01)*   10/28/24 90.4 kg (199 lb 4.8 oz) (97%, Z= 1.93)*     * Growth percentiles are based on CDC (Girls, 2-20 Years) data.       DATA REVIEWED by me:  ECG (my personal interpretation)  6-20-25 sinus, 82  5-2-25 sinus, 63    Heart Monitor    NA    Device check    NA    EP procedure  NA    Echocardiogram    pending    Heart Catheterization    NA    Stress test    N/A    Most recent labs:        No results found for: \"LIPOPROTA\"  Lab Results   Component Value Date/Time    WBC 8.4 06/19/2025 12:34 AM    HEMOGLOBIN 14.4 06/19/2025 12:34 AM    HEMATOCRIT 43.7 06/19/2025 12:34 AM    MCV 80.9 (L) 06/19/2025 12:34 AM      Lab Results   Component Value Date/Time    SODIUM 137 06/19/2025 12:34 AM    POTASSIUM 3.7 06/19/2025 12:34 AM    CHLORIDE 104 06/19/2025 12:34 AM    CO2 20 06/19/2025 12:34 AM    GLUCOSE 92 06/19/2025 12:34 AM    BUN 12 06/19/2025 12:34 AM    CREATININE 0.67 06/19/2025 12:34 AM      Lab Results   Component Value Date/Time    ASTSGOT 25 06/19/2025 12:34 AM    ALTSGPT 19 06/19/2025 12:34 AM    ALBUMIN 4.7 06/19/2025 12:34 AM      No results found for: \"CHOLSTRLTOT\", \"LDL\", \"HDL\", " "\"TRIGLYCERIDE\"  Recent Labs     06/19/25  0034   TROPONINT <6         Past Medical History[1]  Past Surgical History[2]  Family History   Problem Relation Age of Onset    Hyperlipidemia Mother     No Known Problems Father     No Known Problems Sister     No Known Problems Brother      Social History     Socioeconomic History    Marital status: Single     Spouse name: Not on file    Number of children: Not on file    Years of education: Not on file    Highest education level: 12th grade   Occupational History    Not on file   Tobacco Use    Smoking status: Never    Smokeless tobacco: Never   Vaping Use    Vaping status: Some Days    Substances: Nicotine   Substance and Sexual Activity    Alcohol use: Yes     Comment: rare    Drug use: Yes     Types: Marijuana     Comment: rare    Sexual activity: Yes     Partners: Male     Birth control/protection: I.U.D.   Other Topics Concern    Not on file   Social History Narrative    Not on file     Social Drivers of Health     Financial Resource Strain: Low Risk  (3/13/2024)    Overall Financial Resource Strain (CARDIA)     Difficulty of Paying Living Expenses: Not very hard   Food Insecurity: No Food Insecurity (3/13/2024)    Hunger Vital Sign     Worried About Running Out of Food in the Last Year: Never true     Ran Out of Food in the Last Year: Never true   Transportation Needs: No Transportation Needs (3/13/2024)    PRAPARE - Transportation     Lack of Transportation (Medical): No     Lack of Transportation (Non-Medical): No   Physical Activity: Sufficiently Active (3/13/2024)    Exercise Vital Sign     Days of Exercise per Week: 7 days     Minutes of Exercise per Session: 40 min   Stress: Stress Concern Present (3/13/2024)    [place] [employer] - Occupational Stress Questionnaire     Feeling of Stress : To some extent   Social Connections: Moderately Isolated (3/13/2024)    Social Connection and Isolation Panel [NHANES]     Frequency of Communication " "with Friends and Family: More than three times a week     Frequency of Social Gatherings with Friends and Family: More than three times a week     Attends Judaism Services: 1 to 4 times per year     Active Member of Clubs or Organizations: No     Attends Club or Organization Meetings: Never     Marital Status: Never    Intimate Partner Violence: Not on file   Housing Stability: Low Risk  (3/13/2024)    Housing Stability Vital Sign     Unable to Pay for Housing in the Last Year: No     Number of Places Lived in the Last Year: 2     Unstable Housing in the Last Year: No     Allergies[3]    Current Medications[4]    ROS  All others systems reviewed and negative.    BP 90/58 (BP Location: Left arm, Patient Position: Sitting, BP Cuff Size: Adult)   Pulse 74   Resp 14   Ht 1.803 m (5' 11\")   Wt 83.9 kg (185 lb)   SpO2 97%  Body mass index is 25.8 kg/m².    General: No acute distress. Well nourished.  HEENT: EOM grossly intact, no scleral icterus, no pharyngeal erythema.   Neck:  No JVD, no bruits, trachea midline  CVS: RRR. Normal S1, S2. No M/R/G. No LE edema.  2+ radial pulses, 2+ PT pulses  Resp: CTAB. No wheezing or crackles/rhonchi. Normal respiratory effort.  Abdomen: Soft, NT, no sanya hepatomegaly.  MSK/Ext: No clubbing or cyanosis.  Skin: Warm and dry, no rashes.  Neurological: CN III-XII grossly intact. No focal deficits.   Psych: A&O x 3, appropriate affect, good judgement, tearful at times      Return in about 4 weeks (around 7/18/2025).    Written instructions given today:      Autonomic dysfunction, orthostatic tension, POTS and Inappropriate Sinus Tachycardia are all on the spectrum but I simply like to classify as autonomic dysfunction.    This autonomic dysfunction is not a serious heart condition.  It is abnormality in the autonomic nervous system.  Heart rate and blood pressure regulation have very little to do with heart function.    Heart rate and blood pressure are regulated " predominantly by the autonomic nervous system, salt and water balance and hormone balance.  The good news is that you do not have a serious heart condition.    But this can also be frustrating as targeted therapies are implemented through predominantly a trial and error approach.    I am not an expert in these conditions as they are fundamentally not a cardiology condition, however I am willing to try to adjust the situation to help relieve symptoms.  We do not have an autonomic dysfunction center in this area.  There are autonomic dysfunction centers at [place], the [facility] in [facility], [facility] in [facility].  We can refer you if you prefer.    There are very few electrophysiologist cardiologist who deal with physiology.  Electrophysiologist cardiologists in modern times predominantly deal specifically with electrical disease located in the heart.  Your symptoms do not reflect electrical disease so in our region, electrophysiologist have no role.  Your symptoms reflect the autonomic nervous system telling the heart to inappropriately speed up which can cause people to feel very poorly but does not result from cardiac disease.  Some people have inappropriate sinus tachycardia (IST) which means the heart rate speeds up quickly because the heart is being told to beat faster due to inappropriate regulation from the nervous system.    Blood pressure regulation example:  When we stand up, more than 700 mL of blood begins to drop into the legs.  The receptors in the neck and the chest that sense the sudden drop are supposed to tell the blood vessels in the body to clamp down hard resulting in blood pressure going up slightly.  This is something that does not work correctly for some people with PASC or POTS.    Sometimes the faster heart rate is the predominant feature and we use interventions to manipulate the heart rate response.  For some patients it is predominantly the  drop in blood pressure in which case we focus more on keeping the blood volume in the upper body as high as possible.  For many people it is both.    While I am not a specialist, I have found there are few providers in the region willing to take on this diagnosis.  I have been collecting data from other providers and patients to find some things that might help.    =====  Interventions:  1. Salt and water intake is vital.  Some patients have to increase their salt/sodium between 2000 to 4000 mg daily with water.  Drinking regular water will result in water passing straight through to the toilet.  You need the salt to keep the water inside the blood vessels to raise blood pressure.  2.  Compression socks: Do not underestimate the value, even if they only help 5 to 10%.  Compression socks help keep blood from dropping into the legs and staying there.  There are knee-high compression socks, thigh-high compression socks (which often fall down) and pantyhose style compression socks.  Compression socks are typically based on shoe size and the amount of pressure.  You do not need hospital grade socks, but I do recommend you experiment with the amount of pressure until you find what works for you.  The socks are based on shoe size and amount of compression (mmHg).  I recommend a larger size to avoid toe compression.  Compression strengths:  8-15 mmHg (typically very light compression)  15-20 mmHg  20-30 mmHg  30-40 mmhg (can be very tight and challenging to get on)  *Since they are a standard size larger legs need less compression, smaller legs may need more compression.  3.  Exercise: You will need to research a way to exercise and fight back.    -Some people can only exercise for short times and must start and stop.    -Some people need extra recovery time the next day and cannot exercise every day.    -Some people need to exercise lying on the floor, such as [employer].  -Some people can exercise in a recumbent  bicycle.  Some people purchase a leg bicycle to use in their own home.  -You will need to set a goal for yourself, even if it is only for 10 minutes a day.  Your goal is to work up 20 to 30 minutes every day even if it takes months.  Cardiovascular exercises the way to retrain your physiology, to blunt the heart rate response and improve blood pressure.  It is okay to exercise with your compression socks.  Here is some information about exercise you might find helpful:  [email or web address]://[email or web address]l.org/pdf/CH[facility]_Modified_[email or web address]_POTS_Exercise_Program.pdf  4.  Medical therapy interventions to help manipulate physiology can sometimes be helpful.  Please understand these medications are not intended to treat a serious medical situation so you do not have to take them.  These medications are simply there to help you feel better.  If you take any medication that makes you feel worse, stop the medication.    ======  Medical therapy interventions:  1.  Beta-blockers or calcium channel blockers-we often utilize these to blunt the fast heart rate response.  There are many different kinds that can be tried.  I typically start with metoprolol tartrate 25 mg tablets, you can take one half or 1 whole tablet twice daily.  You can take up to 4 tablets twice daily.  The maximum daily dose is metoprolol to tartrate 100 mg a.m. and p.m.  - If metoprolol does not work, sometimes a try propranolol, diltiazem/Cardizem or verapamil.  2.  Midodrine-this medication is like a reverse blood pressure medication.  This medication can be taken every 4-6 hours.  I recommend trying 5 or 10 mg taken 4 or 5 times a day to help relieve symptoms.  If the medication does not help, you do not have to take it.  Some people only take it as needed before exercise or certain activities and do not take it all day long.  3.  Droxidopa-this is an old Parkinson's style medication that can help some people with orthostatic hypotension and therefore help  some people with autonomic dysfunction or POTS.  Sometimes this medication is expensive with your insurance, it can be purchased for cash online through the [facility] pharmacy for $37-80 per month depending on the dose and the frequency.  You can also look for a [employer] coupon.  Some patients get more expensive medications from [facility] and I can provide you with a few phone numbers where patients have had good results.  4. Florinef-is a corticosteroid type medication that stimulates steroids made by your adrenal glands.  Sometimes this helps with fluid and salt retention.  Sometimes long-term use can result in your adrenal glands not making as much of their own so I try to use this as more of a last resort.  5.  IV fluids-some people have such dramatic symptoms that from time to time they go to the infusion center for a liter of normal saline or lactated Ringer's.  This is something we can discuss if you feel it might be helpful.  6.  Ivabradine= Corlanor.  This is a wonderful medication that can treat inappropriate sinus tachycardia by slowing down the sinus node and the heart rate without affecting blood pressure.  Unfortunately, it is very expensive and very difficult to get approved from insurance.  Some people purchased a medication through [facility] for lower price.      It is my pleasure to participate in the care of Ms. Tovar.  Please do not hesitate to contact me with questions or concerns.    Kaylee Walker MD, [facility]  Cardiologist, [facility]    Please note that this dictation was created using voice recognition software. I have made every reasonable attempt to correct obvious errors, but it is possible there are errors of grammar and possibly content that I did not discover before finalizing the note.            DANAE Wiley.-C.  [address] [place] [address]  Via In Basket                   [1]  Past Medical History:  Diagnosis Date    Allergy      Anxiety     Asthma     Clostridium difficile carrier 9/19/2022    C. diff (+) 9/19/22Problem added by Discern Expert based on Positive C Diff test result    Depression     Migraine    [2]  Past Surgical History:  Procedure Laterality Date    BREAST RECONSTRUCTION Bilateral     OTHER ABDOMINAL SURGERY      gastric sleeve   [3]  Allergies  Allergen Reactions    Ciprofloxacin Hives     Cipro    Fosfomycin Hives and Swelling    Nsaids Anaphylaxis, Anxiety, Hives, Nausea, Shortness of Breath and Swelling   [4]  Current Outpatient Medications   Medication Sig Dispense Refill    escitalopram (LEXAPRO) 20 MG tablet Take 20 mg by mouth every day.      lisdexamfetamine (VYVANSE) 50 MG capsule 60 mg.      albuterol 108 (90 Base) MCG/ACT Aero Soln inhalation aerosol       ARIPiprazole (ABILIFY) 5 MG tablet Take 5 mg by mouth every day.      cloNIDine (CATAPRES) 0.2 MG Tab Take 1 Tablet by mouth at bedtime.       No current facility-administered medications for this visit.

## 2025-06-23 ENCOUNTER — RESULTS FOLLOW-UP (OUTPATIENT)
Dept: MEDICAL GROUP | Age: 20
End: 2025-06-23

## 2025-06-23 ENCOUNTER — OFFICE VISIT (OUTPATIENT)
Dept: MEDICAL GROUP | Age: 20
End: 2025-06-23
Payer: COMMERCIAL

## 2025-06-23 VITALS
DIASTOLIC BLOOD PRESSURE: 68 MMHG | HEART RATE: 94 BPM | TEMPERATURE: 98.5 F | OXYGEN SATURATION: 96 % | BODY MASS INDEX: 26.04 KG/M2 | WEIGHT: 186 LBS | SYSTOLIC BLOOD PRESSURE: 102 MMHG | HEIGHT: 71 IN

## 2025-06-23 DIAGNOSIS — R11.0 NAUSEA: ICD-10-CM

## 2025-06-23 DIAGNOSIS — J45.21 MILD INTERMITTENT ASTHMA WITH ACUTE EXACERBATION: ICD-10-CM

## 2025-06-23 DIAGNOSIS — J22 LRTI (LOWER RESPIRATORY TRACT INFECTION): ICD-10-CM

## 2025-06-23 DIAGNOSIS — R05.9 COUGH IN ADULT: Primary | ICD-10-CM

## 2025-06-23 LAB
FLUAV RNA SPEC QL NAA+PROBE: NEGATIVE
FLUBV RNA SPEC QL NAA+PROBE: NEGATIVE
RSV RNA SPEC QL NAA+PROBE: NEGATIVE
SARS-COV-2 RNA RESP QL NAA+PROBE: NEGATIVE

## 2025-06-23 PROCEDURE — 3078F DIAST BP <80 MM HG: CPT | Performed by: PHYSICIAN ASSISTANT

## 2025-06-23 PROCEDURE — 0241U POCT CEPHEID COV-2, FLU A/B, RSV - PCR: CPT | Performed by: PHYSICIAN ASSISTANT

## 2025-06-23 PROCEDURE — 99214 OFFICE O/P EST MOD 30 MIN: CPT | Performed by: PHYSICIAN ASSISTANT

## 2025-06-23 PROCEDURE — 3074F SYST BP LT 130 MM HG: CPT | Performed by: PHYSICIAN ASSISTANT

## 2025-06-23 RX ORDER — ALBUTEROL SULFATE 0.83 MG/ML
2.5 SOLUTION RESPIRATORY (INHALATION) EVERY 4 HOURS PRN
Qty: 25 EACH | Refills: 2 | Status: SHIPPED | OUTPATIENT
Start: 2025-06-23

## 2025-06-23 RX ORDER — PROMETHAZINE HYDROCHLORIDE 25 MG/1
25 TABLET ORAL EVERY 6 HOURS PRN
Qty: 30 TABLET | Refills: 0 | Status: SHIPPED | OUTPATIENT
Start: 2025-06-23

## 2025-06-23 RX ORDER — PREDNISONE 20 MG/1
TABLET ORAL
Qty: 10 TABLET | Refills: 0 | Status: SHIPPED | OUTPATIENT
Start: 2025-06-23

## 2025-06-23 RX ORDER — AZITHROMYCIN 250 MG/1
TABLET, FILM COATED ORAL
Qty: 6 TABLET | Refills: 0 | Status: SHIPPED | OUTPATIENT
Start: 2025-06-23

## 2025-06-23 RX ORDER — ALBUTEROL SULFATE 90 UG/1
INHALANT RESPIRATORY (INHALATION)
Qty: 8.5 G | Status: CANCELLED | OUTPATIENT
Start: 2025-06-23

## 2025-06-23 ASSESSMENT — FIBROSIS 4 INDEX: FIB4 SCORE: 0.35

## 2025-06-23 NOTE — PROGRESS NOTES
"Subjective:     History of Present Illness  The patient presents for evaluation of a recent syncopal episode, asthma exacerbation, and nausea.    She experienced a syncopal episode on Friday, which was preceded by physical exertion during a pickleball game and inadequate hydration. She was subsequently taken to the emergency room where an EKG and blood work were performed. Despite feeling well enough to return home after a 4-hour wait, she later discovered that her blood work results were abnormal. A follow-up consultation with a cardiologist on 06/20/2025 revealed no cardiac abnormalities. However, her symptoms have since escalated, including diarrhea, coughing up dark mucus resembling blood, fever, chills, and dehydration due to persistent vomiting. She has been swabbed for COVID-19, influenza, and RSV. She reports no history of tonsillectomy.    She also reports that her asthma tends to worsen during illness and is requesting a breathing treatment before bedtime. She has a nebulizer at her residence in [place].    Additionally, she mentions that Zofran is no longer effective for her nausea, which she typically takes every 2 to 3 weeks for urinary tract infections (UTIs) and bladder issues. She does not currently have a UTI.      Current medicines (including changes today)  Current Medications[1]  She  has a past medical history of Allergy, Anxiety, Asthma, Clostridium difficile carrier (9/19/2022), Depression, and Migraine.    ROS   No chest pain, no shortness of breath, no abdominal pain  Positive ROS as per HPI.  All other systems reviewed and are negative.     Objective:     /68 (BP Location: Left arm, Patient Position: Sitting, BP Cuff Size: Adult)   Pulse 94   Temp 36.9 °C (98.5 °F) (Temporal)   Ht 1.803 m (5' 11\")   Wt 84.4 kg (186 lb)   SpO2 96%  Body mass index is 25.94 kg/m².   Physical Exam  Mouth/Throat: Throat appears red. Tonsils are not enlarged.  Constitutional: Alert, no " distress.  Skin: Warm, dry, good turgor, no rashes in visible areas.  Eye: Equal, round and reactive, conjunctiva clear, lids normal.  ENMT: Lips without lesions, good dentition, oropharynx clear.  Neck: Trachea midline, no masses, no thyromegaly. No cervical or supraclavicular lymphadenopathy  Respiratory: Unlabored respiratory effort, lungs clear to auscultation, no wheezes, no ronchi.  Cardiovascular: Normal S1, S2, no murmur, no edema.  Abdomen: Soft, non-tender, no masses, no hepatosplenomegaly.  Psych: Alert and oriented x3, normal affect and mood.      Results  Labs   - Blood work: Normal    Diagnostic Testing   - EKG: Normal     Latest Reference Range & Units 06/19/25 00:34   WBC 4.8 - 10.8 K/uL 8.4   RBC 4.20 - 5.40 M/uL 5.40   Hemoglobin 12.0 - 16.0 g/dL 14.4   Hematocrit 37.0 - 47.0 % 43.7   MCV 81.4 - 97.8 fL 80.9 (L)   MCH 27.0 - 33.0 pg 26.7 (L)   MCHC 32.2 - 35.5 g/dL 33.0   RDW 35.9 - 50.0 fL 37.7   Platelet Count 164 - 446 K/uL 328   MPV 9.0 - 12.9 fL 10.2   Neutrophils-Polys 44.00 - 72.00 % 50.60   Neutrophils (Absolute) 1.82 - 7.42 K/uL 4.26   Lymphocytes 22.00 - 41.00 % 41.40 (H)   Lymphs (Absolute) 1.00 - 4.80 K/uL 3.49   Monocytes 0.00 - 13.40 % 7.10   Monos (Absolute) 0.00 - 0.85 K/uL 0.60   Eosinophils 0.00 - 6.90 % 0.50   Eos (Absolute) 0.00 - 0.51 K/uL 0.04   Basophils 0.00 - 1.80 % 0.20   Baso (Absolute) 0.00 - 0.12 K/uL 0.02   Immature Granulocytes 0.00 - 0.90 % 0.20   Immature Granulocytes (abs) 0.00 - 0.11 K/uL 0.02   Nucleated RBC 0.00 - 0.20 /100 WBC 0.00   NRBC (Absolute) K/uL 0.00   Sodium 135 - 145 mmol/L 137   Potassium 3.6 - 5.5 mmol/L 3.7   Chloride 96 - 112 mmol/L 104   Co2 20 - 33 mmol/L 20   Anion Gap 7.0 - 16.0  13.0   Glucose 65 - 99 mg/dL 92   Bun 8 - 22 mg/dL 12   Creatinine 0.50 - 1.40 mg/dL 0.67   GFR (CKD-EPI) >60 mL/min/1.73 m 2 128   Calcium 8.5 - 10.5 mg/dL 9.5   Correct Calcium 8.5 - 10.5 mg/dL 8.9   AST(SGOT) 12 - 45 U/L 25   ALT(SGPT) 2 - 50 U/L 19   Alkaline  Phosphatase 30 - 99 U/L 75   Total Bilirubin 0.1 - 1.5 mg/dL 0.8   Albumin 3.2 - 4.9 g/dL 4.7   Total Protein 6.0 - 8.2 g/dL 7.7   Globulin 1.9 - 3.5 g/dL 3.0   A-G Ratio g/dL 1.6   Lipase 11 - 82 U/L 19   Troponin T 6 - 19 ng/L <6   (L): Data is abnormally low  (H): Data is abnormally high    Assessment and Plan:   The following treatment plan was discussed    Assessment & Plan  1. Viral infection.  - Symptoms include diarrhea, coughing with dark mucus, fever, chills, and dehydration.  - Physical examination revealed red throat without tonsillar enlargement; COVID-19, flu, and RSV swabs were ordered.  - Discussion included the likely viral etiology, possibly influenza, and the ineffectiveness of antibiotics for viral infections.  - Prescriptions for a Z-Raymon (with strict instruction to only fill if symptoms falling outside viral window which I discussed  at length),  advised to rest, hydrate, and consider a chest x-ray if symptoms worsen in the second week.    2. Asthma.  - Symptoms exacerbated by current illness.  - Physical examination noted respiratory distress.  - Discussed the need for nebulizer treatments and the option to purchase a nebulizer for faster access.  - Prescriptions for albuterol 3 mL every 4 hours as needed and prednisone were provided. And prednisone    3. Nausea.  - Persistent nausea not relieved by Zofran.  - Discussed alternative medications for nausea management.  - Prescription for Phenergan 25 mg provided with instructions to use short-term and alternate with Zofran as needed.      Polina was seen today for nausea.    Diagnoses and all orders for this visit:    Cough in adult  -     POCT Cepheid CoV-2, Flu A/B, RSV - PCR    Mild intermittent asthma with acute exacerbation  -     albuterol (PROVENTIL) 2.5mg/3ml Nebu Soln solution for nebulization; Take 3 mL by nebulization every four hours as needed for Shortness of Breath.  -     predniSONE (DELTASONE) 20 MG Tab; Take one pill twice a day  for five days    LRTI (lower respiratory tract infection)  -     azithromycin (ZITHROMAX) 250 MG Tab; Take two tablets on day one, then on tablet the following four days  -     DX-CHEST-2 VIEWS; Future    Nausea  -     promethazine (PHENERGAN) 25 MG Tab; Take 1 Tablet by mouth every 6 hours as needed for Nausea/Vomiting.              Follow Up: with pcp prn    Please note that this dictation was created using voice recognition software. I have made every reasonable attempt to correct obvious errors, but I expect that there are errors of grammar and possibly content that I did not discover before finalizing the note.      Attestation      Verbal consent was acquired by the patient to use [employer] ambient listening note generation during this visit Yes                  [1]   Current Outpatient Medications   Medication Sig Dispense Refill    escitalopram (LEXAPRO) 20 MG tablet Take 20 mg by mouth every day.      lisdexamfetamine (VYVANSE) 50 MG capsule 60 mg.      albuterol 108 (90 Base) MCG/ACT Aero Soln inhalation aerosol       ARIPiprazole (ABILIFY) 5 MG tablet Take 5 mg by mouth every day.      cloNIDine (CATAPRES) 0.2 MG Tab Take 1 Tablet by mouth at bedtime.       No current facility-administered medications for this visit.

## 2025-06-24 ENCOUNTER — HOSPITAL ENCOUNTER (OUTPATIENT)
Facility: MEDICAL CENTER | Age: 20
End: 2025-06-24
Attending: OTOLARYNGOLOGY | Admitting: OTOLARYNGOLOGY
Payer: COMMERCIAL

## 2025-07-29 ENCOUNTER — APPOINTMENT (OUTPATIENT)
Dept: ADMISSIONS | Facility: MEDICAL CENTER | Age: 20
End: 2025-07-29
Attending: OTOLARYNGOLOGY
Payer: COMMERCIAL

## 2025-07-31 ENCOUNTER — PRE-ADMISSION TESTING (OUTPATIENT)
Dept: ADMISSIONS | Facility: MEDICAL CENTER | Age: 20
End: 2025-07-31
Attending: OTOLARYNGOLOGY
Payer: COMMERCIAL

## 2025-07-31 ENCOUNTER — APPOINTMENT (OUTPATIENT)
Facility: MEDICAL CENTER | Age: 20
End: 2025-07-31
Payer: COMMERCIAL

## 2025-08-13 ENCOUNTER — TELEPHONE (OUTPATIENT)
Facility: MEDICAL CENTER | Age: 20
End: 2025-08-13
Payer: COMMERCIAL

## 2025-08-13 DIAGNOSIS — R55 NEAR SYNCOPE: Primary | ICD-10-CM
